# Patient Record
Sex: MALE | Race: WHITE | NOT HISPANIC OR LATINO | Employment: FULL TIME | URBAN - METROPOLITAN AREA
[De-identification: names, ages, dates, MRNs, and addresses within clinical notes are randomized per-mention and may not be internally consistent; named-entity substitution may affect disease eponyms.]

---

## 2018-02-19 ENCOUNTER — OFFICE VISIT (OUTPATIENT)
Dept: FAMILY MEDICINE | Facility: CLINIC | Age: 38
End: 2018-02-19
Payer: COMMERCIAL

## 2018-02-19 VITALS
OXYGEN SATURATION: 99 % | SYSTOLIC BLOOD PRESSURE: 140 MMHG | DIASTOLIC BLOOD PRESSURE: 82 MMHG | HEART RATE: 75 BPM | TEMPERATURE: 98.5 F | WEIGHT: 201 LBS

## 2018-02-19 DIAGNOSIS — M72.2 PLANTAR FASCIITIS: Primary | ICD-10-CM

## 2018-02-19 PROCEDURE — 99203 OFFICE O/P NEW LOW 30 MIN: CPT | Performed by: NURSE PRACTITIONER

## 2018-02-19 RX ORDER — NAPROXEN 500 MG/1
500 TABLET ORAL 2 TIMES DAILY PRN
Qty: 60 TABLET | Refills: 1 | Status: SHIPPED | OUTPATIENT
Start: 2018-02-19

## 2018-02-19 NOTE — PATIENT INSTRUCTIONS
Plantar Fascitis:  -Occurs from overuse or starting a new activity too quickly instead of gradually working your way into it (ie: very common in people who start running or walking after not running or walking for a long period of time)  -Find orthotic supports for your shoes at a local pharmacy, custom orthotics may be necessary but start with the ones over the counter first  -Review stretching handout provided today  -Freeze plastic water bottle and roll it on the bottom of your foot for 10 minutes a few times a day with a sock on (to prevent burning your skin)  -Take Naproxen up to twice a day as needed for pain relief  -Generally, avoiding the bothersome activity, maintaining a good weight, and stretching regularly can help prevent this from happening again      Please follow up with podiatry as below if pain not improved after this week.

## 2018-02-19 NOTE — LETTER
February 19, 2018      Bassem Dougherty  [address] [place] [address]        To Whom It May Concern:    Bassem Dougherty was seen in our clinic 2/19/2018, please excuse his absence from work todya. He may return to work with the following: must not stand or walk for longer than 2 hours at a time and for no more than 6 hours in total during the work day.  He may perform sitting duties.  These restrictions should be in place until 2/26/2018 and then he can return to full duties.      Sincerely,        CLIVE Munroe CNP

## 2018-02-19 NOTE — PROGRESS NOTES
SUBJECTIVE:   Bassem Dougherty is a 37 year old male who presents to clinic today for the following health issues:  Joint Pain    Onset: 2/15/18    Description:   Location: right foot    Character: Sharp- when standing up    Intensity: moderate- only when stands on it or walks, worse on stairs    Progression of Symptoms: gotten better, but has to go back to work soon and pain gets worse with walking    Accompanying Signs & Symptoms:  Other symptoms: radiation of pain to back to ankle, when stretching it hurts    History:   Previous similar pain: no       Precipitating factors:   Trauma or overuse: no - stands a lot, works in ER    Alleviating factors:  Improved by: rest/inactivity  Therapies Tried and outcome: ibuprofen- helped, tylenol    Wears sneakers at work, rotates between pairs, they are all about a year old, on feet x 12 hours, no new physical activity other than his job which he started about 6 months ago.  He has had mild pain since starting but became more severe last week.  He is obese.        Problem list and histories reviewed & adjusted, as indicated.  Additional history: as documented    There is no problem list on file for this patient.    History reviewed. No pertinent surgical history.    Social History   Substance Use Topics     Smoking status: Former Smoker     Smokeless tobacco: Never Used     Alcohol use Yes      Comment: once a week     Family History   Problem Relation Age of Onset     HEART DISEASE Mother      Enlarged prostate Father      HEART DISEASE Maternal Grandmother      DIABETES Maternal Grandmother      Coronary Artery Disease Early Onset Maternal Grandfather          Current Outpatient Prescriptions   Medication Sig Dispense Refill     naproxen (NAPROSYN) 500 MG tablet Take 1 tablet (500 mg) by mouth 2 times daily as needed for moderate pain 60 tablet 1     BP Readings from Last 3 Encounters:   02/19/18 140/82    Wt Readings from Last 3 Encounters:   02/19/18 201 lb (91.2 kg)                     Reviewed and updated as needed this visit by clinical staff       Reviewed and updated as needed this visit by Provider         ROS:  Constitutional, HEENT, cardiovascular, pulmonary, gi and gu systems are negative, except as otherwise noted.    OBJECTIVE:     /82 (BP Location: Left arm, Patient Position: Chair, Cuff Size: Adult Large)  Pulse 75  Temp 98.5  F (36.9  C) (Oral)  Wt 201 lb (91.2 kg)  SpO2 99%  There is no height or weight on file to calculate BMI.  GENERAL: healthy, alert and no distress  MS: Right foot:  tenderness to palpation over dorsal aspect of calcaneous, pain worse with dorsiflexion of foot, with dorsiflexion also has pain at distal insertion of achilles tendon, normal ROM, circulation, and sensation  SKIN: no suspicious lesions or rashes  NEURO: Normal strength and tone, mentation intact and speech normal  PSYCH: mentation appears normal, affect normal/bright    Diagnostic Test Results:  none     ASSESSMENT/PLAN:     1. Plantar fasciitis  Patient has essentially failed conservative management which he has already been doing at home.  Suggested over the counter orthotics and then follow up with podiatry for further mangement and possible injection.  Understands plan.  Note written for work.  - PODIATRY/FOOT & ANKLE SURGERY REFERRAL  - naproxen (NAPROSYN) 500 MG tablet; Take 1 tablet (500 mg) by mouth 2 times daily as needed for moderate pain  Dispense: 60 tablet; Refill: 1    Patient Instructions   Plantar Fascitis:  -Occurs from overuse or starting a new activity too quickly instead of gradually working your way into it (ie: very common in people who start running or walking after not running or walking for a long period of time)  -Find orthotic supports for your shoes at a local pharmacy, custom orthotics may be necessary but start with the ones over the counter first  -Review stretching handout provided today  -Freeze plastic water bottle and roll it on the bottom  of your foot for 10 minutes a few times a day with a sock on (to prevent burning your skin)  -Take Naproxen up to twice a day as needed for pain relief  -Generally, avoiding the bothersome activity, maintaining a good weight, and stretching regularly can help prevent this from happening again      Please follow up with podiatry as below if pain not improved after this week.      CLIVE Munroe [facility]

## 2018-02-19 NOTE — MR AVS SNAPSHOT
After Visit Summary   2/19/2018    Bassem Dougherty    MRN: [id number]           Patient Information     Date Of Birth          1980        Visit Information        Provider Department      2/19/2018 3:00 PM Whitney Hoyt APRN [facility]        Today's Diagnoses     Plantar fasciitis    -  1      Care Instructions    Plantar Fascitis:  -Occurs from overuse or starting a new activity too quickly instead of gradually working your way into it (ie: very common in people who start running or walking after not running or walking for a long period of time)  -Find orthotic supports for your shoes at a local pharmacy, custom orthotics may be necessary but start with the ones over the counter first  -Review stretching handout provided today  -Freeze plastic water bottle and roll it on the bottom of your foot for 10 minutes a few times a day with a sock on (to prevent burning your skin)  -Take Naproxen up to twice a day as needed for pain relief  -Generally, avoiding the bothersome activity, maintaining a good weight, and stretching regularly can help prevent this from happening again      Please follow up with podiatry as below if pain not improved after this week.          Follow-ups after your visit        Additional Services     PODIATRY/FOOT & ANKLE SURGERY REFERRAL       Your provider has referred you to: [facility]: [facility] [phone]   [email or web address]    Please be aware that coverage of these services is subject to the terms and limitations of your health insurance plan.  Call member services at your health plan with any benefit or coverage questions.      Please bring the following to your appointment:  >>   Any x-rays, CTs or MRIs which have been performed.  Contact the facility where they were done to arrange for  prior to your scheduled appointment.    >>   List of current medications   >>   This referral  "request   >>   Any documents/labs given to you for this referral                  Who to contact     If you have questions or need follow up information about today's clinic visit or your schedule please contact [facility] directly at [phone].  Normal or non-critical lab and imaging results will be communicated to you by [employer], letter or phone within 4 business days after the clinic has received the results. If you do not hear from us within 7 days, please contact the clinic through [employer] or phone. If you have a critical or abnormal lab result, we will notify you by phone as soon as possible.  Submit refill requests through [employer] or call your pharmacy and they will forward the refill request to us. Please allow 3 business days for your refill to be completed.          Additional Information About Your Visit        [employer] Information     [employer] lets you send messages to your doctor, view your test results, renew your prescriptions, schedule appointments and more. To sign up, go to [email or web address] . Click on \"Log in\" on the left side of the screen, which will take you to the Welcome page. Then click on \"Sign up Now\" on the right side of the page.     You will be asked to enter the access code listed below, as well as some personal information. Please follow the directions to create your username and password.     Your access code is: [email or web address]  Expires: 2018  3:24 PM     Your access code will  in 90 days. If you need help or a new code, please call your [facility] clinic or [phone].        Care EveryWhere ID     This is your Care EveryWhere ID. This could be used by other organizations to access your [facility] medical records  [id number]        Your Vitals Were     Pulse Temperature Pulse Oximetry             75 98.5  F (36.9  C) (Oral) 99%          Blood Pressure from Last 3 Encounters:   18 140/82    Weight from Last 3 Encounters:   18 201 lb " (91.2 kg)              We Performed the Following     PODIATRY/FOOT & ANKLE SURGERY REFERRAL          Today's Medication Changes          These changes are accurate as of 2/19/18  3:24 PM.  If you have any questions, ask your nurse or doctor.               Start taking these medicines.        Dose/Directions    naproxen 500 MG tablet   Commonly known as:  NAPROSYN   Used for:  Plantar fasciitis   Started by:  Whitney Hoyt APRN CNP        Dose:  500 mg   Take 1 tablet (500 mg) by mouth 2 times daily as needed for moderate pain   Quantity:  60 tablet   Refills:  1            Where to get your medicines      These medications were sent to [facility] - [place], [place] - [address]  [address] [address]     Phone:  [phone]     naproxen 500 MG tablet                Primary Care Provider Fax #    Physician No Ref-Primary [phone]       No address on file        Equal Access to Services     [facility] : Hadii bruno hernández hadasho Soomaali, waaxda luqadaha, qaybta kaalmada adeegyada, becky kwong . So [facility] [phone].    ATENCIÓN: Si habla español, tiene a gracia disposición servicios gratuitos de asistencia lingüística. Llame al [phone].    We comply with applicable federal civil rights laws and [place] laws. We do not discriminate on the basis of race, color, national origin, age, disability, sex, sexual orientation, or gender identity.            Thank you!     Thank you for choosing [facility]  for your care. Our goal is always to provide you with excellent care. Hearing back from our patients is one way we can continue to improve our services. Please take a few minutes to complete the written survey that you may receive in the mail after your visit with us. Thank you!             Your Updated Medication List - Protect others around you: Learn how to safely use, store and throw away your medicines at  www.disposemymeds.org.          This list is accurate as of 2/19/18  3:24 PM.  Always use your most recent med list.                   Brand Name Dispense Instructions for use Diagnosis    naproxen 500 MG tablet    NAPROSYN    60 tablet    Take 1 tablet (500 mg) by mouth 2 times daily as needed for moderate pain    Plantar fasciitis

## 2020-01-09 NOTE — PATIENT INSTRUCTIONS
Lifestyle recommendations:  Being overweight or obese puts you are risk of major health problems including but not limited to: heart disease/heart attack, stroke, high cholesterol, high blood pressure, and diabetes.  This is why it is important to be at a healthy weight for your height.     Exercise 30 minutes 3-5 times a week, if you can only do 10 minutes 3 times a week that is still shown to have great benefit!  Brisk walking even counts for this.  Consider free youtube videos for exercise that fits your needs and lifestyle.     Monitor your caffeine and soda intake, try to minimize these beverages    Drink plenty of water (about 70-80 ounces a day)    Try to eat a vegetable and fruit  with lunch and dinner.  Have a breakfast that contains protein such as eggs or oatmeal.  Decrease your white bread, pasta, and sweets intake.  Increase lean proteins like chicken or pork. Try to eat out 1-2 times a week or less.  Monitor your portion sizes, try using smaller plates if needed.  Eat slowly, this gives you time to be aware that your body is full.   Let me know at any time if you would like a referral to a nutritionist!            Preventive Health Recommendations  Male Ages 40 to 49    Yearly exam:             See your health care provider every year in order to  o   Review health changes.   o   Discuss preventive care.    o   Review your medicines if your doctor has prescribed any.    You should be tested each year for STDs (sexually transmitted diseases) if you re at risk.     Have a cholesterol test every 5 years.     Have a colonoscopy (test for colon cancer) if someone in your family has had colon cancer or polyps before age 50.     After age 45, have a diabetes test (fasting glucose). If you are at risk for diabetes, you should have this test every 3 years.      Talk with your health care provider about whether or not a prostate cancer screening test (PSA) is right for you.    Shots: Get a flu shot each year.  Get a tetanus shot every 10 years.     Nutrition:    Eat at least 5 servings of fruits and vegetables daily.     Eat whole-grain bread, whole-wheat pasta and brown rice instead of white grains and rice.     Get adequate Calcium and Vitamin D.     Lifestyle    Exercise for at least 150 minutes a week (30 minutes a day, 5 days a week). This will help you control your weight and prevent disease.     Limit alcohol to one drink per day.     No smoking.     Wear sunscreen to prevent skin cancer.     See your dentist every six months for an exam and cleaning.

## 2020-01-09 NOTE — PROGRESS NOTES
3  SUBJECTIVE:   CC: Bassem Dougherty is an 39 year old male who presents for preventive health visit.     Healthy Habits:    New patient to me:          Answers for HPI/ROS submitted by the patient on 1/20/2020   Annual Exam:  Frequency of exercise:: 2-3 days/week  Getting at least 3 servings of Calcium per day:: Yes  Diet:: Regular (no restrictions)  Taking medications regularly:: Not Applicable  Medication side effects:: Not applicable  Bi-annual eye exam:: NO  Dental care twice a year:: Yes  Sleep apnea or symptoms of sleep apnea:: None  abdominal pain: No  Blood in stool: No  Blood in urine: No  chest pain: No  chills: No  congestion: No  constipation: No  cough: No  diarrhea: No  dizziness: No  ear pain: No  eye pain: No  nervous/anxious: No  fever: No  frequency: No  genital sores: No  headaches: No  hearing loss: No  heartburn: No  arthralgias: No  joint swelling: No  peripheral edema: No  mood changes: No  myalgias: No  nausea: No  dysuria: No  palpitations: No  Skin sensation changes: No  sore throat: No  urgency: No  rash: No  shortness of breath: No  visual disturbance: No  weakness: No  impotence: No  penile discharge: No  Additional concerns today:: No  Duration of exercise:: 30-45 minutes    Checks blood pressure at work. Usually is ok unless he drinks caffeine. Works overnights.     Dad with BPH.     Is fasting.     Had thyroid issue when a young kid per patient but no medications recently. Has had weight gain and would like labs.       Has 2 kids. Nonsmoker. Quit 10 years ago.   Works as emergency room nurse.     H/o IBS.  Had colonoscopy 15 years ago.  Today's PHQ-2 Score:   PHQ-2 ( 1999 [employer]) 1/20/2020 2/19/2018   Q1: Little interest or pleasure in doing things 0 0   Q2: Feeling down, depressed or hopeless 0 0   PHQ-2 Score 0 0   Q1: Little interest or pleasure in doing things Not at all -   Q2: Feeling down, depressed or hopeless Not at all -   PHQ-2 Score 0 -       Abuse: Current or  Past(Physical, Sexual or Emotional)- No  Do you feel safe in your environment? Yes        Social History     Tobacco Use     Smoking status: Former Smoker     Smokeless tobacco: Never Used   Substance Use Topics     Alcohol use: Yes     Comment: once a week     If you drink alcohol do you typically have >3 drinks per day or >7 drinks per week? No                      Last PSA: No results found for: PSA    Reviewed orders with patient. Reviewed health maintenance and updated orders accordingly - Yes  Lab work is in process  Labs reviewed in EPIC  BP Readings from Last 3 Encounters:   01/20/20 132/82   02/19/18 140/82    Wt Readings from Last 3 Encounters:   01/20/20 89.8 kg (198 lb)   02/19/18 91.2 kg (201 lb)                  Patient Active Problem List   Diagnosis     Weight gain     Special screening for malignant neoplasm of prostate     Past Surgical History:   Procedure Laterality Date     NO HISTORY OF SURGERY         Social History     Tobacco Use     Smoking status: Former Smoker     Smokeless tobacco: Never Used   Substance Use Topics     Alcohol use: Yes     Comment: once a week     Family History   Problem Relation Age of Onset     Heart Disease Mother      Enlarged prostate Father      Heart Disease Maternal Grandmother      Diabetes Maternal Grandmother      Coronary Artery Disease Early Onset Maternal Grandfather          Current Outpatient Medications   Medication Sig Dispense Refill     naproxen (NAPROSYN) 500 MG tablet Take 1 tablet (500 mg) by mouth 2 times daily as needed for moderate pain 60 tablet 1       Reviewed and updated as needed this visit by clinical staff  Tobacco  Allergies  Meds  Med Hx  Surg Hx  Fam Hx  Soc Hx        Reviewed and updated as needed this visit by Provider        Past Medical History:   Diagnosis Date     NO ACTIVE PROBLEMS       Past Surgical History:   Procedure Laterality Date     NO HISTORY OF SURGERY           OBJECTIVE:   /82   Pulse 69   Temp 98.2  " F (36.8  C) (Oral)   Resp 16   Ht 1.626 m (5' 4\")   Wt 89.8 kg (198 lb)   SpO2 96%   BMI 33.99 kg/m    EXAM:  GENERAL: alert, no distress and obese  EYES: Eyes grossly normal to inspection, PERRL and conjunctivae and sclerae normal  HENT: ear canals and TM's normal, nose and mouth without ulcers or lesions  NECK: no adenopathy, no asymmetry, masses, or scars and thyroid normal to palpation  RESP: lungs clear to auscultation - no rales, rhonchi or wheezes  CV: regular rate and rhythm, normal S1 S2, no S3 or S4, no murmur, click or rub, no peripheral edema and peripheral pulses strong  ABDOMEN: soft, nontender, no hepatosplenomegaly, no masses and bowel sounds normal  MS: no gross musculoskeletal defects noted, no edema  SKIN: no suspicious lesions or rashes  NEURO: Normal strength and tone, mentation intact and speech normal  PSYCH: mentation appears normal, affect normal/bright  Gu:  declined      ASSESSMENT/PLAN:   1. Routine general medical examination at a health care facility      2. Special screening for malignant neoplasm of prostate    - PSA, screen    3. Screening for diabetes mellitus    - Basic metabolic panel    4. Lipid screening    - Lipid panel reflex to direct LDL Fasting  - ALT  - AST    5. Weight gain    - TSH with free T4 reflex    COUNSELING:  Reviewed preventive health counseling, as reflected in patient instructions       Regular exercise       Healthy diet/nutrition       Vision screening       Hearing screening    Estimated body mass index is 33.99 kg/m  as calculated from the following:    Height as of this encounter: 1.626 m (5' 4\").    Weight as of this encounter: 89.8 kg (198 lb).    Weight management plan: Discussed healthy diet and exercise guidelines     reports that he has quit smoking. He has never used smokeless tobacco.     Patient Instructions   Lifestyle recommendations:  Being overweight or obese puts you are risk of major health problems including but not limited to: heart " disease/heart attack, stroke, high cholesterol, high blood pressure, and diabetes.  This is why it is important to be at a healthy weight for your height.     Exercise 30 minutes 3-5 times a week, if you can only do 10 minutes 3 times a week that is still shown to have great benefit!  Brisk walking even counts for this.  Consider free youtube videos for exercise that fits your needs and lifestyle.     Monitor your caffeine and soda intake, try to minimize these beverages    Drink plenty of water (about 70-80 ounces a day)    Try to eat a vegetable and fruit  with lunch and dinner.  Have a breakfast that contains protein such as eggs or oatmeal.  Decrease your white bread, pasta, and sweets intake.  Increase lean proteins like chicken or pork. Try to eat out 1-2 times a week or less.  Monitor your portion sizes, try using smaller plates if needed.  Eat slowly, this gives you time to be aware that your body is full.   Let me know at any time if you would like a referral to a nutritionist!            Preventive Health Recommendations  Male Ages 40 to 49    Yearly exam:             See your health care provider every year in order to  o   Review health changes.   o   Discuss preventive care.    o   Review your medicines if your doctor has prescribed any.    You should be tested each year for STDs (sexually transmitted diseases) if you re at risk.     Have a cholesterol test every 5 years.     Have a colonoscopy (test for colon cancer) if someone in your family has had colon cancer or polyps before age 50.     After age 45, have a diabetes test (fasting glucose). If you are at risk for diabetes, you should have this test every 3 years.      Talk with your health care provider about whether or not a prostate cancer screening test (PSA) is right for you.    Shots: Get a flu shot each year. Get a tetanus shot every 10 years.     Nutrition:    Eat at least 5 servings of fruits and vegetables daily.     Eat whole-grain bread,  whole-wheat pasta and brown rice instead of white grains and rice.     Get adequate Calcium and Vitamin D.     Lifestyle    Exercise for at least 150 minutes a week (30 minutes a day, 5 days a week). This will help you control your weight and prevent disease.     Limit alcohol to one drink per day.     No smoking.     Wear sunscreen to prevent skin cancer.     See your dentist every six months for an exam and cleaning.              Counseling Resources:  ATP IV Guidelines  Pooled Cohorts Equation Calculator  FRAX Risk Assessment  ICSI Preventive Guidelines  Dietary Guidelines for Americans, 2010  USDA's MyPlate  ASA Prophylaxis  Lung CA Screening    Denise Mattson PA-C  [facility]

## 2020-01-20 ENCOUNTER — OFFICE VISIT (OUTPATIENT)
Dept: FAMILY MEDICINE | Facility: CLINIC | Age: 40
End: 2020-01-20
Payer: COMMERCIAL

## 2020-01-20 VITALS
BODY MASS INDEX: 33.8 KG/M2 | OXYGEN SATURATION: 96 % | RESPIRATION RATE: 16 BRPM | TEMPERATURE: 98.2 F | HEIGHT: 64 IN | WEIGHT: 198 LBS | SYSTOLIC BLOOD PRESSURE: 132 MMHG | HEART RATE: 69 BPM | DIASTOLIC BLOOD PRESSURE: 82 MMHG

## 2020-01-20 DIAGNOSIS — Z00.00 ROUTINE GENERAL MEDICAL EXAMINATION AT A HEALTH CARE FACILITY: Primary | ICD-10-CM

## 2020-01-20 DIAGNOSIS — R63.5 WEIGHT GAIN: ICD-10-CM

## 2020-01-20 DIAGNOSIS — Z13.1 SCREENING FOR DIABETES MELLITUS: ICD-10-CM

## 2020-01-20 DIAGNOSIS — Z13.220 LIPID SCREENING: ICD-10-CM

## 2020-01-20 DIAGNOSIS — Z12.5 SPECIAL SCREENING FOR MALIGNANT NEOPLASM OF PROSTATE: ICD-10-CM

## 2020-01-20 LAB
ALT SERPL W P-5'-P-CCNC: 131 U/L (ref 0–70)
ANION GAP SERPL CALCULATED.3IONS-SCNC: 6 MMOL/L (ref 3–14)
AST SERPL W P-5'-P-CCNC: 75 U/L (ref 0–45)
BUN SERPL-MCNC: 16 MG/DL (ref 7–30)
CALCIUM SERPL-MCNC: 9.2 MG/DL (ref 8.5–10.1)
CHLORIDE SERPL-SCNC: 104 MMOL/L (ref 94–109)
CHOLEST SERPL-MCNC: 212 MG/DL
CO2 SERPL-SCNC: 27 MMOL/L (ref 20–32)
CREAT SERPL-MCNC: 1.03 MG/DL (ref 0.66–1.25)
GFR SERPL CREATININE-BSD FRML MDRD: 89 ML/MIN/{1.73_M2}
GLUCOSE SERPL-MCNC: 97 MG/DL (ref 70–99)
HDLC SERPL-MCNC: 53 MG/DL
LDLC SERPL CALC-MCNC: 127 MG/DL
NONHDLC SERPL-MCNC: 159 MG/DL
POTASSIUM SERPL-SCNC: 4.3 MMOL/L (ref 3.4–5.3)
PSA SERPL-ACNC: 0.33 UG/L (ref 0–4)
SODIUM SERPL-SCNC: 137 MMOL/L (ref 133–144)
TRIGL SERPL-MCNC: 162 MG/DL
TSH SERPL DL<=0.005 MIU/L-ACNC: 3.49 MU/L (ref 0.4–4)

## 2020-01-20 PROCEDURE — 84450 TRANSFERASE (AST) (SGOT): CPT | Performed by: PHYSICIAN ASSISTANT

## 2020-01-20 PROCEDURE — 84443 ASSAY THYROID STIM HORMONE: CPT | Performed by: PHYSICIAN ASSISTANT

## 2020-01-20 PROCEDURE — 80048 BASIC METABOLIC PNL TOTAL CA: CPT | Performed by: PHYSICIAN ASSISTANT

## 2020-01-20 PROCEDURE — G0103 PSA SCREENING: HCPCS | Performed by: PHYSICIAN ASSISTANT

## 2020-01-20 PROCEDURE — 36415 COLL VENOUS BLD VENIPUNCTURE: CPT | Performed by: PHYSICIAN ASSISTANT

## 2020-01-20 PROCEDURE — 90715 TDAP VACCINE 7 YRS/> IM: CPT | Performed by: PHYSICIAN ASSISTANT

## 2020-01-20 PROCEDURE — 80061 LIPID PANEL: CPT | Performed by: PHYSICIAN ASSISTANT

## 2020-01-20 PROCEDURE — 99396 PREV VISIT EST AGE 40-64: CPT | Mod: 25 | Performed by: PHYSICIAN ASSISTANT

## 2020-01-20 PROCEDURE — 90471 IMMUNIZATION ADMIN: CPT | Performed by: PHYSICIAN ASSISTANT

## 2020-01-20 PROCEDURE — 84460 ALANINE AMINO (ALT) (SGPT): CPT | Performed by: PHYSICIAN ASSISTANT

## 2020-01-20 ASSESSMENT — ENCOUNTER SYMPTOMS
HEADACHES: 0
ABDOMINAL PAIN: 0
NERVOUS/ANXIOUS: 0
PARESTHESIAS: 0
SHORTNESS OF BREATH: 0
COUGH: 0
ARTHRALGIAS: 0
JOINT SWELLING: 0
PALPITATIONS: 0
CHILLS: 0
DIZZINESS: 0
DIARRHEA: 0
NAUSEA: 0
MYALGIAS: 0
SORE THROAT: 0
CONSTIPATION: 0
FEVER: 0
HEMATOCHEZIA: 0
HEARTBURN: 0
HEMATURIA: 0
EYE PAIN: 0
FREQUENCY: 0
DYSURIA: 0
WEAKNESS: 0

## 2020-01-20 ASSESSMENT — MIFFLIN-ST. JEOR: SCORE: 1714.12

## 2020-01-23 ENCOUNTER — DOCUMENTATION ONLY (OUTPATIENT)
Dept: LAB | Facility: CLINIC | Age: 40
End: 2020-01-23

## 2020-01-23 ENCOUNTER — MYC MEDICAL ADVICE (OUTPATIENT)
Dept: FAMILY MEDICINE | Facility: CLINIC | Age: 40
End: 2020-01-23

## 2020-01-23 ENCOUNTER — TELEPHONE (OUTPATIENT)
Dept: FAMILY MEDICINE | Facility: CLINIC | Age: 40
End: 2020-01-23

## 2020-01-23 DIAGNOSIS — R74.8 ELEVATED LIVER ENZYMES: Primary | ICD-10-CM

## 2020-01-23 NOTE — TELEPHONE ENCOUNTER
Pt notified of provider message as written.  Pt verbalized good understanding.  Lab appointment made and ultrasound scheduling number given to pt.    Pt states he did have an abnormal reading one time before when he had drank alcohol the night before the test. He did have alcohol a few days before this lab draw. To provider as shree.  Sujey EDUARDON, RN

## 2020-01-23 NOTE — TELEPHONE ENCOUNTER
Notes recorded by Denise Mattson PA-C on 1/23/2020 at 11:23 AM CST  PLEASE CALL PATIENT:  Dear Bassem,      It was a pleasure to see you at your recent office visit.  Thyroid normal. Your test results are listed below.  psa or prostate screening test normal.  Sodium and potassium normal. Blood sugar (glucose) normal.  Creatinine and GFR normal, which means kidney function is normal. Liver enzymes are significantly elevated. Have you had this before?  We will need to get more bloodwork done and an ultrasound of your liver. Avoid tylenol and alcohol completely until further notice.  Cholesterol was elevated some also. Eating healthy will help with this.  I have ordered labs and US.  Please schedule them and I will f/u with results.           If you have any questions or concerns, please call the clinic at [phone].    Sincerely,  Denise Mattson PA-C

## 2020-01-23 NOTE — RESULT ENCOUNTER NOTE
PLEASE CALL PATIENT:  Dear Bassem,      It was a pleasure to see you at your recent office visit.  Thyroid normal. Your test results are listed below.  psa or prostate screening test normal.  Sodium and potassium normal. Blood sugar (glucose) normal.  Creatinine and GFR normal, which means kidney function is normal. Liver enzymes are significantly elevated. Have you had this before?  We will need to get more bloodwork done and an ultrasound of your liver. Avoid tylenol and alcohol completely until further notice.  Cholesterol was elevated some also. Eating healthy will help with this.  I have ordered labs and US.  Please schedule them and I will f/u with results.           If you have any questions or concerns, please call the clinic at [phone].    Sincerely,  Denise Mattson PA-C

## 2020-03-30 ENCOUNTER — E-VISIT (OUTPATIENT)
Dept: FAMILY MEDICINE | Facility: CLINIC | Age: 40
End: 2020-03-30
Payer: COMMERCIAL

## 2020-03-30 DIAGNOSIS — B34.9 VIRAL ILLNESS: Primary | ICD-10-CM

## 2020-03-30 PROCEDURE — 99207 ZZC NON-BILLABLE SERV PER CHARTING: CPT | Performed by: PHYSICIAN ASSISTANT

## 2020-05-01 ENCOUNTER — MYC MEDICAL ADVICE (OUTPATIENT)
Dept: FAMILY MEDICINE | Facility: CLINIC | Age: 40
End: 2020-05-01

## 2020-05-01 NOTE — TELEPHONE ENCOUNTER
That or they could do video plus xray appointment, but they have to come to clinic either way for xray. Being  4 weeks out as long as they had no symptoms we could do the chest xray.         Denise Mattson PA-C

## 2020-05-08 ENCOUNTER — ANCILLARY PROCEDURE (OUTPATIENT)
Dept: GENERAL RADIOLOGY | Facility: CLINIC | Age: 40
End: 2020-05-08
Attending: NURSE PRACTITIONER
Payer: COMMERCIAL

## 2020-05-08 ENCOUNTER — OFFICE VISIT (OUTPATIENT)
Dept: FAMILY MEDICINE | Facility: CLINIC | Age: 40
End: 2020-05-08
Payer: COMMERCIAL

## 2020-05-08 VITALS
TEMPERATURE: 98.7 F | HEART RATE: 80 BPM | BODY MASS INDEX: 32.61 KG/M2 | SYSTOLIC BLOOD PRESSURE: 133 MMHG | WEIGHT: 190 LBS | DIASTOLIC BLOOD PRESSURE: 85 MMHG

## 2020-05-08 DIAGNOSIS — Z86.16 HISTORY OF 2019 NOVEL CORONAVIRUS DISEASE (COVID-19): ICD-10-CM

## 2020-05-08 DIAGNOSIS — Z86.16 HISTORY OF 2019 NOVEL CORONAVIRUS DISEASE (COVID-19): Primary | ICD-10-CM

## 2020-05-08 DIAGNOSIS — J18.9 PNEUMONIA OF BOTH LUNGS DUE TO INFECTIOUS ORGANISM, UNSPECIFIED PART OF LUNG: ICD-10-CM

## 2020-05-08 PROCEDURE — 99214 OFFICE O/P EST MOD 30 MIN: CPT | Performed by: NURSE PRACTITIONER

## 2020-05-08 PROCEDURE — 71046 X-RAY EXAM CHEST 2 VIEWS: CPT

## 2020-05-08 NOTE — PROGRESS NOTES
Subjective     Bassem Dougherty is a 39 year old male who presents to clinic today for the following health issues:    HPI     How are you feeling today? Much better  In the past 24 hours have you had shortness of breath when speaking, walking, or climbing stairs? I don't have breathing problems  Do you have a cough? I don't have a cough  When is the last time you had a fever greater than 100? 3-4 weeks   Are you having any other symptoms? None   Do you have any other stressors you would like to discuss with your provider? No    Patient admitted to [facility] from 4/1/20-4/6/20 with COVID-19 infection and bilateral pneumonia.  He reports he never required intubation but was on high-flow oxygen up to 15 liters at one point and was in the ICU for 2 of the days.  Patient reports he is fully recovered.  He feels well.  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  No cough, fever or SOB for almost a month.  He denies any chronic health conditions.  He was found to have elevated transaminases at his physical in January of 2020.  He was supposed to get a RUQ US to assess his liver but he did not follow through with that.  While recently hospitalized, his AST and ALT were still mildly elevated, but improved.  He does not want to have any labs checked today and is not interested in obtained US.  He states he only drinks alcohol occasionally but was recently drinking a lot because he had a few days off of work and it was [facility].     Patient Active Problem List   Diagnosis     Weight gain     Special screening for malignant neoplasm of prostate     BMI 33.0-33.9,adult     Past Surgical History:   Procedure Laterality Date     NO HISTORY OF SURGERY         Social History     Tobacco Use     Smoking status: Former Smoker     Smokeless tobacco: Never Used   Substance Use Topics     Alcohol use: Yes     Comment: once a week     Family History   Problem Relation Age of Onset     Heart Disease Mother       Enlarged prostate Father      Heart Disease Maternal Grandmother      Diabetes Maternal Grandmother      Coronary Artery Disease Early Onset Maternal Grandfather          Current Outpatient Medications   Medication Sig Dispense Refill     naproxen (NAPROSYN) 500 MG tablet Take 1 tablet (500 mg) by mouth 2 times daily as needed for moderate pain 60 tablet 1     No Known Allergies    Reviewed and updated as needed this visit by Provider  Tobacco  Allergies  Meds  Problems  Med Hx  Surg Hx  Fam Hx         Review of Systems   ROS COMP: Constitutional, HEENT, cardiovascular, pulmonary, gi and gu systems are negative, except as otherwise noted.      Objective    /85   Pulse 80   Temp 98.7  F (37.1  C) (Oral)   Wt 86.2 kg (190 lb)   BMI 32.61 kg/m    Body mass index is 32.61 kg/m .  Physical Exam   GENERAL: healthy, alert and no distress  EYES: Eyes grossly normal to inspection, PERRL and conjunctivae and sclerae normal  HENT: ear canals and TM's normal, nose and mouth without ulcers or lesions  NECK: no adenopathy, no asymmetry, masses, or scars and thyroid normal to palpation  RESP: lungs clear to auscultation - no rales, rhonchi or wheezes  CV: regular rate and rhythm, normal S1 S2, no S3 or S4, no murmur, click or rub, no peripheral edema and peripheral pulses strong  MS: no gross musculoskeletal defects noted, no edema  SKIN: no suspicious lesions or rashes  NEURO: Normal strength and tone, mentation intact and speech normal  PSYCH: mentation appears normal, affect normal/bright    Diagnostic Test Results:  Labs reviewed in Epic        Assessment & Plan       ICD-10-CM    1. History of 2019 novel coronavirus disease (COVID-19)  Z86.19 XR Chest 2 Views   2. Pneumonia of both lungs due to infectious organism, unspecified part of lung  J18.9 XR Chest 2 Views     Patient doing well.  Normal exam, lung sound clear.  He feels good, is back to work.  > 30 days since his initial symptom onset.  Will obtain  CXR to follow-up on pneumonia.  He does not wish to pursue labs or US to further evaluate elevated transaminases.  I encouraged him to work on diet and exercise and cut back on alcohol.      See Patient Instructions  Patient Instructions   We will get chest xray to day to make sure that your pneumonia is clearing up.      Return for physical in January 2021, sooner if issues or concerns.       Return in about 8 months (around 1/8/2021) for Annual physical exam, Lab Work.    Tsering Caldwell, CNP  [facility]

## 2021-01-03 ENCOUNTER — HEALTH MAINTENANCE LETTER (OUTPATIENT)
Age: 41
End: 2021-01-03

## 2021-01-13 ENCOUNTER — OFFICE VISIT (OUTPATIENT)
Dept: URGENT CARE | Facility: URGENT CARE | Age: 41
End: 2021-01-13
Payer: COMMERCIAL

## 2021-01-13 VITALS
HEART RATE: 90 BPM | DIASTOLIC BLOOD PRESSURE: 93 MMHG | OXYGEN SATURATION: 99 % | RESPIRATION RATE: 18 BRPM | SYSTOLIC BLOOD PRESSURE: 148 MMHG | TEMPERATURE: 98.5 F

## 2021-01-13 DIAGNOSIS — S01.01XA SCALP LACERATION, INITIAL ENCOUNTER: Primary | ICD-10-CM

## 2021-01-13 DIAGNOSIS — S09.90XA CLOSED HEAD INJURY, INITIAL ENCOUNTER: ICD-10-CM

## 2021-01-13 PROCEDURE — 12004 RPR S/N/AX/GEN/TRK7.6-12.5CM: CPT | Performed by: FAMILY MEDICINE

## 2021-01-13 PROCEDURE — 99213 OFFICE O/P EST LOW 20 MIN: CPT | Mod: 25 | Performed by: FAMILY MEDICINE

## 2021-01-14 NOTE — PATIENT INSTRUCTIONS
Patient Education     First Aid: Head Injuries  A strong blow to the head may cause swelling and bleeding inside the skull. The resulting pressure can injure the brain (concussion). If you have any doubts about a concussion, have a healthcare provider check the victim.  When to call 911  Call 911 right away if any of the following is true:    The victim loses consciousness or is lethargic.    The victim has convulsions or seizures.    The victim has unequal pupil size. The pupil is the black part in the center of the eye.    The victim shows any of the following signs of concussion:  ? Confusion or inability to follow normal conversation  ? Slurred speech  ? Dizziness or vision problems  ? Nausea or vomiting  ? Muscle weakness or loss of mobility  ? Memory loss  ? Sensitivity to noise    A depressed or spongy area in the skull, or visible bone fragments    Clear fluid draining out of  the ears or nose    Bruising behind the ears or around the eyes  While you wait for help:    Reassure the person.    Treat for shock by maintaining body temperature and keeping the victim calm.    Do rescue breathing or CPR, if needed.  If the person has neck or back pain or is unconscious, he or she might have a spine fracture. Move the person only with great caution and only if absolutely needed.   Step 1. Control bleeding    Apply direct pressure to control bleeding. Wear gloves or use other protection to avoid contact with victim's blood.    Wash a minor surface injury with soap and water after the bleeding stops or is reduced.    Cover the wound with a clean dressing and bandage.  Step 2. Ice bumps and bruises    Place a cold pack or ice on the injury to reduce swelling and pain. Placing a cloth between the skin and the ice pack helps prevent tissue damage from severe cold.  Step 3. Observe the victim    Watch for vomiting or changes in mood or alertness. If you notice changes, call for medical help. Signs of concussion may not  appear for up to 48 hours.    Tell the person's partner, parent, or roommate about the injury so he or she can continue to observe the victim.  Stitches  If a cut is deep or continues to bleed, or the edges of skin don't stay together evenly, the wound may need to be closed with stitches, tape, staples, or medical glue. Any of these can help speed healing and reduce the risk for infection and the size of the scar. These may be especially important concerns with large wounds, and wounds on the head or other visible body parts.  If you think a wound may need medical care, see a healthcare provider as soon as possible. If you need stitches, they must be done in the first few hours. A wound that is not properly closed is at risk for serious infection.  [employer] last reviewed this educational content on 12/1/2017 2000-2020 [employer]. [address] [place] [address]. All rights reserved. This information is not intended as a substitute for professional medical care. Always follow your healthcare professional's instructions.           Patient Education     Scalp Laceration, Stitches or Staples  A laceration is a cut through the skin. A scalp laceration may require stitches or staples. It may also be closed with a hair positioning technique such as braiding. There are a lot of blood vessels in the scalp. Because of this, a lot of bleeding is common with scalp cuts. You may need a tetanus shot if you are not up to date on your tetanus vaccine.   Home care  These guidelines will help you care for your laceration at home:     During the first 2 days you may carefully rinse your hair in the shower to remove blood and glass or dirt particles. After 2 days you may shower and shampoo your hair normally. Don't scrub repaired area or let water run on it for a long time.    Have someone help you clean your wound every day:  ? In the shower, wash the area with soap and water. Use a wet cotton swab to  loosen and remove any blood or crust that forms.  ? After cleaning, keep the wound clean and dry. Talk with your doctor about applying antibiotic ointment to the wound. Apply a fresh bandage.    Don't put your head underwater until the stitches or staples have been removed. This means no swimming.    Your doctor may prescribe an antibiotic cream or ointment to prevent infection. Don't stop taking this medicine until you have finished the prescribed course or your doctor tells you to stop.    Your doctor may prescribe medicines for pain. If no pain medicines were prescribed, you can use over-the-counter pain medicines. Follow instructions for taking these medicines. Talk with your doctor before using these medicines if you have chronic liver or kidney disease. Also talk with your doctor if you have ever had a stomach ulcer or gastrointestinal bleeding.  Follow-up care  Follow up with your healthcare provider, or as advised. Check the wound daily for the signs of infection listed below. Stitches or staples are usually removed from the scalp in about 7 to 10 days.   Call 911  Call 911 if this occurs:     Bleeding can't be controlled by direct pressure  When to seek medical advice  Call your healthcare provider right away if any of these occur:     Signs of infection, including increasing pain in the wound, redness, swelling, or pus coming from the wound    Fever of 100.4 F (38 C) or higher, or as directed by your healthcare provider    Stitches or staples come apart or fall out before 7 days    Wound edges re-open  [employer] last reviewed this educational content on 8/1/2019 2000-2020 [employer]. [address] [place] [address], [place] [address]. All rights reserved. This information is not intended as a substitute for professional medical care. Always follow your healthcare professional's instructions.

## 2021-01-14 NOTE — PROGRESS NOTES
HPI:Bassem Dougherty is a 40 year old male here today with complaint of scalp laceration    Just now slipped on ice landed on the back of his head and suffered a laceration     head injury:Yes  loss of consciousness:  No  syncope or presyncope: No  chest pain or palpitation: No  mechanical fall:  Yes  using assistive devices:  No  blood thinners: No    ROS:  as per hpi  denies headache  denies any nausea or vomiting  denies any amnesia, confusion or concussion symptoms  denies any blurring of vision  denies any otorrhea or rhinorhea  denies any neck pain  denies any back pain.  denies any chest pain or shortness of breath  denies any joint pain except noted above.  denies any bowel or bladder incontinence or motor or sensory deficits.  denies any abdominal pain, nausea or vomiting or flank pain  denies any hematuria      No Known Allergies    Past Medical History:   Diagnosis Date     IBS (irritable bowel syndrome)             naproxen (NAPROSYN) 500 MG tablet, Take 1 tablet (500 mg) by mouth 2 times daily as needed for moderate pain    No current facility-administered medications on file prior to visit.       Social History     Tobacco Use     Smoking status: Former Smoker     Smokeless tobacco: Never Used   Substance Use Topics     Alcohol use: Yes     Comment: once a week     Drug use: No       ROS:  review of systems negative except for noted above.       OBJECTIVE:  There were no vitals taken for this visit.   General:   awake, alert, and cooperative.  NAD.   Head: Normocephalic, no step-offs   Eyes: Conjunctiva clear,   ENT: no periorbital ecchymosis, no otorrhea or rhinorrhea, negative Pagan's sign, no raccoon eyes, no hematympanum  Heart: Regular rate and rhythm. No murmur.  Lungs: Chest is clear; no wheezes or rales.   Abdomen: soft non-tender. No bruising noted.   Neuro: Alert and oriented - normal speech. Cranial nerves intact, MMT 5/5 all extremities, sensory intact, normal gait and normal cerebellar  function  MS: Using extremities freely , No cervical, thoracic, or lumbar spine tenderness  PSYCH:  Normal affect, normal speech  SKIN:     Last tetanus booster within 10 years: yes  Laceration LOCATION: scalp posterior /temporal area  Crossing the midlie   Size of laceration: 9 centimeters  Characteristics of the laceration: bleeding- mild  Wound clean. No Foreign body noted.     LACERATION REPAIR:   Oral consent received.  Patient aware of risks which include but are not limited to infection, bleeding, iatrogenic injury. Alternative treatment plan was also discussed  Copious irrigation with normal saline done and cleansed with some hibiclens    locally injected with Lidocaine 1% with epinephrine  At the lace  ,  good anesthesia achieved.    Wound cleaned with saline. No FBs.    Sterile fields maintained   Laceration was closed using 10 staples   Bacitracin dressing.  Patient tolerated procedure well.      Advised to watch for signs or symptoms of infection. If with any concerns of infection advised to come in immediately to be reassessed. Routine wound care discussed.              ASSESSMENT:    ICD-10-CM    1. Scalp laceration, initial encounter  S01.01XA    2. Closed head injury, initial encounter  S09.90XA          PLAN:   Signs and symptoms of concussion discussed. If with worsening symptoms or concerns proceed to ER  Aware to go to the ER if with worsening symptoms of headache, nausea or vomiting, chest pain or shortness of breath, bowel/bladder incontinence, motor or sensory deficits, bloody urine, changes in behavior, confusion, difficulty walking or seizure    Follow up:  Staple removal in 7-10 days   Advised about symptoms which might herald more serious problems.        Leanne Antoine MD

## 2021-03-07 ENCOUNTER — HEALTH MAINTENANCE LETTER (OUTPATIENT)
Age: 41
End: 2021-03-07

## 2021-10-10 ENCOUNTER — HEALTH MAINTENANCE LETTER (OUTPATIENT)
Age: 41
End: 2021-10-10

## 2022-03-26 ENCOUNTER — HEALTH MAINTENANCE LETTER (OUTPATIENT)
Age: 42
End: 2022-03-26

## 2022-09-18 ENCOUNTER — HEALTH MAINTENANCE LETTER (OUTPATIENT)
Age: 42
End: 2022-09-18

## 2023-05-06 ENCOUNTER — HEALTH MAINTENANCE LETTER (OUTPATIENT)
Age: 43
End: 2023-05-06

## 2024-05-22 ENCOUNTER — ANCILLARY PROCEDURE (OUTPATIENT)
Dept: GENERAL RADIOLOGY | Facility: CLINIC | Age: 44
End: 2024-05-22
Payer: COMMERCIAL

## 2024-05-22 ENCOUNTER — OFFICE VISIT (OUTPATIENT)
Dept: URGENT CARE | Facility: URGENT CARE | Age: 44
End: 2024-05-22
Payer: COMMERCIAL

## 2024-05-22 VITALS
OXYGEN SATURATION: 97 % | HEART RATE: 115 BPM | DIASTOLIC BLOOD PRESSURE: 70 MMHG | BODY MASS INDEX: 33.47 KG/M2 | WEIGHT: 195 LBS | RESPIRATION RATE: 18 BRPM | TEMPERATURE: 103 F | SYSTOLIC BLOOD PRESSURE: 105 MMHG

## 2024-05-22 DIAGNOSIS — R50.9 FEVER, UNSPECIFIED: ICD-10-CM

## 2024-05-22 DIAGNOSIS — J06.9 VIRAL URI WITH COUGH: Primary | ICD-10-CM

## 2024-05-22 LAB
DEPRECATED S PYO AG THROAT QL EIA: NEGATIVE
FLUAV AG SPEC QL IA: NEGATIVE
FLUBV AG SPEC QL IA: NEGATIVE
GROUP A STREP BY PCR: NOT DETECTED
SARS-COV-2 RNA RESP QL NAA+PROBE: NEGATIVE

## 2024-05-22 PROCEDURE — 99203 OFFICE O/P NEW LOW 30 MIN: CPT | Mod: 25

## 2024-05-22 PROCEDURE — 87804 INFLUENZA ASSAY W/OPTIC: CPT

## 2024-05-22 PROCEDURE — 87635 SARS-COV-2 COVID-19 AMP PRB: CPT

## 2024-05-22 PROCEDURE — 71046 X-RAY EXAM CHEST 2 VIEWS: CPT | Mod: TC | Performed by: RADIOLOGY

## 2024-05-22 PROCEDURE — 87651 STREP A DNA AMP PROBE: CPT

## 2024-05-22 PROCEDURE — 96372 THER/PROPH/DIAG INJ SC/IM: CPT

## 2024-05-22 RX ORDER — KETOROLAC TROMETHAMINE 30 MG/ML
30 INJECTION, SOLUTION INTRAMUSCULAR; INTRAVENOUS ONCE
Status: COMPLETED | OUTPATIENT
Start: 2024-05-22 | End: 2024-05-22

## 2024-05-22 RX ADMIN — KETOROLAC TROMETHAMINE 30 MG: 30 INJECTION, SOLUTION INTRAMUSCULAR; INTRAVENOUS at 11:43

## 2024-05-22 NOTE — PROGRESS NOTES
Assessment & Plan:      Problem List Items Addressed This Visit    None  Visit Diagnoses       Viral URI with cough    -  Primary    Fever, unspecified        Relevant Medications    ketorolac (TORADOL) injection 30 mg (Completed)    Other Relevant Orders    Streptococcus A Rapid Screen w/Reflex to PCR - Clinic Collect (Completed)    Influenza A & B Antigen - Clinic Collect (Completed)    Group A Streptococcus PCR Throat Swab    XR Chest 2 Views (Completed)    Symptomatic COVID-19 Virus (Coronavirus) by PCR Nose          Medical Decision Making    Viral URI  Productive Cough  L>R Diminished lung sounds  Suspect viral illness. Toradol given in clinic with improvement of symptoms, remains febrile. Reassuringly not hypoxic. Patient will complete home COVID test at home, will send Paxlovid if positive. CXR without focal consolidation. Low suspicion for bacterial process at this time. Through shared decision making patient comfortable with home care and monitoring. Patient is ED RN and comfortable with plan.   - Follow Strep Culture, however low suspicion given cough  - Follow COVID swab   - Patient to complete home swab rapid test  - Defer antibiotics  - Ibuprofen, Tylenol for Fever, Pain  - Anticipatory guidance given  - Return to ED precautions given    Patient verbalizes understanding of the treatment regimen and will follow up as needed for recheck and evaluation if symptoms worsen or do not improve. Patient states understanding and agreement with the plan.    Calos De Los Santos MD  Internal Medicine-Pediatrics, PGY-3     Subjective:      Bassem Dougherty is a 44 year old male here for evaluation of productive cough, fever, and chills.    Started on Sunday with sore throat, congestion, fevers, body aches, chills, and fatigue. Now has productive cough with yellowish sputum production. Chills present. 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 Some nausea, vomiting. No diarrhea. No chest pain. Progressively  feeling worse.      The following portions of the patient's history were reviewed and updated as appropriate: allergies, current medications, and problem list.     Review of Systems  Pertinent items are noted in HPI.  All other systems are negative.    Allergies  No Known Allergies    Family History   Problem Relation Age of Onset    Heart Disease Mother     Enlarged prostate Father     Heart Disease Maternal Grandmother     Diabetes Maternal Grandmother     Coronary Artery Disease Early Onset Maternal Grandfather        Social History     Tobacco Use    Smoking status: Former    Smokeless tobacco: Never   Substance Use Topics    Alcohol use: Yes     Comment: once a week        Objective:      /70   Pulse 115   Temp (!) 103  F (39.4  C) (Tympanic)   Resp 18   Wt 88.5 kg (195 lb)   SpO2 97%   BMI 33.47 kg/m    General appearance - Febrile, oriented to person, place, and time, anxious, in mild to moderate distress, and ill-appearing  Mental status - alert, oriented to person, place, and time  Eyes - pupils equal and reactive, extraocular eye movements intact  Neck - Supple  Chest - Tachypnea, diminished bilaterally in the lung bases L>R,  coarse diffusely.  Heart - Tachycardic, regular rhythm, no m/r/g  Abdomen - Nondistended, nontender  Neurological - alert, oriented, normal speech, no focal findings or movement disorder noted  Extremities - No edema  Skin - normal coloration and turgor, no rashes, no suspicious skin lesions noted     Lab & Imaging Results    Results for orders placed or performed in visit on 05/22/24 (from the past 24 hour(s))   Streptococcus A Rapid Screen w/Reflex to PCR - Clinic Collect    Specimen: Throat; Swab   Result Value Ref Range    Group A Strep antigen Negative Negative   Influenza A & B Antigen - Clinic Collect    Specimen: Nose; Swab   Result Value Ref Range    Influenza A antigen Negative Negative    Influenza B antigen Negative Negative    Narrative    Test results must  be correlated with clinical data. If necessary, results should be confirmed by a molecular assay or viral culture.     I personally reviewed these results and discussed findings with the patient.

## 2024-05-22 NOTE — PATIENT INSTRUCTIONS
Ibuprofen, Tylenol alternating as needed for fever and pain  Can take home covid test and message me  Lots of fluids  If not getting better go to the ED

## 2024-05-22 NOTE — NURSING NOTE
Clinic Administered Medication Documentation        Patient was given Ketorolac. Prior to medication administration, verified patient's identity using patient s name and date of birth. Please see MAR and medication order for additional information. Patient instructed to remain in clinic for 15 minutes and report any adverse reaction to staff immediately.    Vial/Syringe: Single dose vial. Was entire vial of medication used? Yes    Yancy Chang, CMA

## 2024-05-26 ENCOUNTER — OFFICE VISIT (OUTPATIENT)
Dept: URGENT CARE | Facility: URGENT CARE | Age: 44
End: 2024-05-26
Payer: COMMERCIAL

## 2024-05-26 ENCOUNTER — ANCILLARY PROCEDURE (OUTPATIENT)
Dept: GENERAL RADIOLOGY | Facility: CLINIC | Age: 44
End: 2024-05-26
Payer: COMMERCIAL

## 2024-05-26 VITALS
TEMPERATURE: 100.9 F | DIASTOLIC BLOOD PRESSURE: 85 MMHG | RESPIRATION RATE: 16 BRPM | SYSTOLIC BLOOD PRESSURE: 138 MMHG | BODY MASS INDEX: 33.56 KG/M2 | OXYGEN SATURATION: 99 % | WEIGHT: 195.5 LBS | HEART RATE: 104 BPM

## 2024-05-26 DIAGNOSIS — R05.1 ACUTE COUGH: ICD-10-CM

## 2024-05-26 DIAGNOSIS — J06.9 VIRAL URI WITH COUGH: Primary | ICD-10-CM

## 2024-05-26 PROCEDURE — 71046 X-RAY EXAM CHEST 2 VIEWS: CPT | Mod: TC | Performed by: RADIOLOGY

## 2024-05-26 PROCEDURE — 99213 OFFICE O/P EST LOW 20 MIN: CPT

## 2024-05-26 RX ORDER — BENZONATATE 200 MG/1
200 CAPSULE ORAL 3 TIMES DAILY PRN
Qty: 21 CAPSULE | Refills: 0 | Status: SHIPPED | OUTPATIENT
Start: 2024-05-26

## 2024-05-26 ASSESSMENT — PAIN SCALES - GENERAL: PAINLEVEL: MILD PAIN (2)

## 2024-05-26 NOTE — LETTER
May 26, 2024      Bassem Dougherty  [address]  [facility] [id number]        To Whom It May Concern:    Bassem Dougherty  was seen on May 26, 2024.  Please excuse him from work until the next scheduled shift after May 30th due to illness.        Sincerely,        CLIVE Cali CNP

## 2024-05-26 NOTE — PROGRESS NOTES
ASSESSMENT:  (J06.9) Viral URI with cough  (primary encounter diagnosis)  Plan: benzonatate (TESSALON) 200 MG capsule    (R05.1) Acute cough  Plan: XR Chest 2 Views    PLAN:  Informed the patient that the chest x-ray does not show any pneumonia per the radiologist report.  We discussed that his symptoms are likely related to a viral illness.  We also discussed the need for him to get plenty rest, drink fluids and use Tylenol and or ibuprofen as needed for pain and fever with the maximum dose of Tylenol being 4000 mg in a 24-hour period of time and to take ibuprofen with food to avoid upset stomach.  Informed him he can continue to use nasal saline spray and/or humidifier/steam for the nasal symptoms.  We discussed using a Princess pot for his nasal symptoms.  We also discussed using honey and taking benzonatate for the cough.  Work note provided.  Informed the patient to return to clinic with any new or worsening symptoms.  Patient acknowledged his understanding of the above plan.    The use of Dragon/[employer] dictation services may have been used to construct the content in this note; any grammatical or spelling errors are non-intentional. Please contact the author of this note directly if you are in need of any clarification.      CLIVE Cali CNP      SUBJECTIVE:   Bassem Dougherty is a 44 year old male presenting with a chief complaint of fever, chills, runny nose, stuffy nose, and cough - productive.  Onset of symptoms was 6 day(s) ago.  Course of illness is worsening.    Patient denies: ear pain, sore throat, vomiting, and diarrhea  Treatment measures tried include Mucinex, Tylenol, ibuprofen, steam and nasal spray.  Predisposing factors include None.    Patient is concerned he may have pneumonia and would like a chest x-ray.      ROS:  Negative except noted above.    OBJECTIVE:  /85   Pulse 104   Temp (!) 100.9  F (38.3  C) (Tympanic)   Resp 16   Wt 88.7 kg (195 lb 8 oz)   SpO2 99%   BMI  33.56 kg/m    GENERAL APPEARANCE: healthy, alert and no distress  EYES: EOMI,  PERRL, conjunctiva clear  HENT: ear canals and TM's normal.  Nose and mouth without ulcers, erythema or lesions  NECK: supple, nontender, no lymphadenopathy  RESP: lungs clear to auscultation - no rales, rhonchi or wheezes  CV: regular rates and rhythm, normal S1 S2, no murmur noted  SKIN: no suspicious lesions or rashes

## 2024-05-27 NOTE — PATIENT INSTRUCTIONS
Chest x-ray does not show pneumonia per the radiologist report.  Get plenty of rest and drink fluids.  Can use Tylenol and/or ibuprofen as needed for pain and fever.  Maximum dose of Tylenol is 4000mg in a 24 hour period of time.  Take ibuprofen with food to avoid stomach upset.  Continue to use nasal saline spray and/or humidifier/steam for the nasal symptoms.  Use a Princess Pot for your nasal symptoms.  You can also use honey for your cough.  Take benzoate for the cough.

## 2024-07-14 ENCOUNTER — HEALTH MAINTENANCE LETTER (OUTPATIENT)
Age: 44
End: 2024-07-14

## 2025-07-19 ENCOUNTER — HEALTH MAINTENANCE LETTER (OUTPATIENT)
Age: 45
End: 2025-07-19